# Patient Record
Sex: FEMALE | URBAN - METROPOLITAN AREA
[De-identification: names, ages, dates, MRNs, and addresses within clinical notes are randomized per-mention and may not be internally consistent; named-entity substitution may affect disease eponyms.]

---
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
## 2021-06-23 ENCOUNTER — OFFICE VISIT (OUTPATIENT)
Dept: NEUROLOGY | Facility: MEDICAL CENTER | Age: 21
End: 2021-06-23
Attending: PSYCHIATRY & NEUROLOGY
Payer: COMMERCIAL

## 2021-06-23 VITALS
WEIGHT: 277.78 LBS | DIASTOLIC BLOOD PRESSURE: 68 MMHG | BODY MASS INDEX: 41.14 KG/M2 | OXYGEN SATURATION: 95 % | TEMPERATURE: 97.8 F | HEART RATE: 77 BPM | SYSTOLIC BLOOD PRESSURE: 116 MMHG | HEIGHT: 69 IN

## 2021-06-23 DIAGNOSIS — G43.109 MIGRAINE WITH AURA AND WITHOUT STATUS MIGRAINOSUS, NOT INTRACTABLE: Primary | ICD-10-CM

## 2021-06-23 PROCEDURE — 99203 OFFICE O/P NEW LOW 30 MIN: CPT | Performed by: PSYCHIATRY & NEUROLOGY

## 2021-06-23 PROCEDURE — 99211 OFF/OP EST MAY X REQ PHY/QHP: CPT | Performed by: PSYCHIATRY & NEUROLOGY

## 2021-06-23 RX ORDER — SUMATRIPTAN SUCCINATE 4 MG/.5ML
1 INJECTION, SOLUTION SUBCUTANEOUS PRN
COMMUNITY
Start: 2021-06-21

## 2021-06-23 RX ORDER — SUMATRIPTAN SUCCINATE 4 MG/.5ML
INJECTION, SOLUTION SUBCUTANEOUS PRN
COMMUNITY
End: 2021-06-23

## 2021-06-23 RX ORDER — RIBOFLAVIN (VITAMIN B2) 400 MG
400 TABLET ORAL DAILY
Qty: 90 TABLET | Refills: 2 | Status: SHIPPED | OUTPATIENT
Start: 2021-06-23

## 2021-06-23 ASSESSMENT — PATIENT HEALTH QUESTIONNAIRE - PHQ9: CLINICAL INTERPRETATION OF PHQ2 SCORE: 0

## 2021-06-23 NOTE — PATIENT INSTRUCTIONS
Take the following vitamins for migraine prevention:   Riboflavin 400mg daily   Magnesium citrate 400mg daily   Coenzyme Q10 300mg daily

## 2021-06-23 NOTE — PROGRESS NOTES
Chief Complaint   Patient presents with   • New Patient     Migraine       History of present illness:  Karen Quintero 20 y.o. female with episodic migraine, using sumatriptan.     On waking up, she first noticed loss of sensation on one side of her body associated with incoherent speech. Following the numbness, she experiences nausea and a stabbing posterior headache, usually the right side. Sumatriptan has been effective but she did not have it.   She has had migraines 3 times in the past month but she did not have migraines for the past year  She has noticed that tomatoes trigger her migraines.    Her headache is similar to her previous headaches but she has never had incoherent speech and numbness before.   Last November she started an oral estrogen containing birth control.   She has been having migraines since age 12.     Past medical history:   Past Medical History:   Diagnosis Date   • Allergy, unspecified not elsewhere classified    • Asthma    • Headache(784.0)    • Headache, classical migraine        Past surgical history:   History reviewed. No pertinent surgical history.    Family history:   History reviewed. No pertinent family history.    Social history:   Social History     Socioeconomic History   • Marital status: Single     Spouse name: Not on file   • Number of children: Not on file   • Years of education: Not on file   • Highest education level: Not on file   Occupational History   • Not on file   Tobacco Use   • Smoking status: Never Smoker   • Smokeless tobacco: Never Used   Substance and Sexual Activity   • Alcohol use: No   • Drug use: No   • Sexual activity: Not on file   Other Topics Concern   • Not on file   Social History Narrative   • Not on file     Social Determinants of Health     Financial Resource Strain:    • Difficulty of Paying Living Expenses:    Food Insecurity:    • Worried About Running Out of Food in the Last Year:    • Ran Out of Food in the Last Year:    Transportation  "Needs:    • Lack of Transportation (Medical):    • Lack of Transportation (Non-Medical):    Physical Activity:    • Days of Exercise per Week:    • Minutes of Exercise per Session:    Stress:    • Feeling of Stress :    Social Connections:    • Frequency of Communication with Friends and Family:    • Frequency of Social Gatherings with Friends and Family:    • Attends [employer] Services:    • Active Member of Clubs or Organizations:    • Attends Club or Organization Meetings:    • Marital Status:    Intimate Partner Violence:    • Fear of Current or Ex-Partner:    • Emotionally Abused:    • Physically Abused:    • Sexually Abused:        Current medications:   Current Outpatient Medications   Medication   • Riboflavin 400 MG Tab   • Magnesium Citrate 200 MG Tab   • Coenzyme Q10 300 MG Cap   • NS SOLN 60 mL with albuterol 2.5 mg/0.5 mL NEBU 5 mL   • promethazine (PHENERGAN) 25 MG Tab   • SUMAtriptan Succinate 4 MG/0.5ML Solution Auto-injector   • hydrocodone-acetaminophen (NORCO) 5-325 MG Tab per tablet     No current facility-administered medications for this visit.       Medication Allergy:  Allergies   Allergen Reactions   • Cephalosporins Anaphylaxis   • Chicken-Derived Products Anaphylaxis   • Peanut Oil Anaphylaxis   • Penicillins Anaphylaxis   • Shellfish Allergy Anaphylaxis   • Tree Nuts Food Allergy Anaphylaxis     Physical examination:   Vitals:    06/23/21 0725   BP: 116/68   BP Location: Left arm   Patient Position: Sitting   BP Cuff Size: Adult   Pulse: 77   Temp: 36.6 °C (97.8 °F)   TempSrc: Temporal   SpO2: 95%   Weight: (!) 126 kg (277 lb 12.5 oz)   Height: 1.753 m (5' 9\")     Neurological Exam  Mental Status  Awake and alert. Speech is normal. Language is fluent with no aphasia.    Cranial Nerves  CN II: Right normal visual field. Left normal visual field. Right funduscopic exam: disc intact. Left funduscopic exam: disc intact.  CN III, IV, VI: Extraocular movements intact bilaterally. No nystagmus. " Normal smooth pursuit.  CN V:  Right: Facial sensation is normal.  Left: Facial sensation is normal on the left.    Motor   No abnormal involuntary movements. Strength is 5/5 throughout all four extremities.    Sensory  Light touch is normal in upper and lower extremities.     Reflexes                                           Right                      Left  Brachioradialis                    0                         0  Biceps                                 0                         0  Patellar                                2+                         2+    Gait  Casual gait is normal including stance, stride, and arm swing. Normal tandem gait.      Labs:  None    Imagin/10/21 MRI BRAIN W/ W/O:   FINDINGS:     Metal artifact arises from the oral cavity causing some limited due to   visualization of the status of the lower anterior frontal poles, right greater   than left on the DWI and GRE T2 pulse sequences.  This area is sufficiently   evaluated on the standard MRI pulse sequence images and appears normal.     No restriction in water diffusion is demonstrated elsewhere within the brain to   suggest acute ischemia.  No acute intracranial hemorrhage, mass, mass effect or   abnormal contrast enhancement identified.  The cortical sulci and ventricles are   normal in size and shape.  Brain signal intensity appears normal on the standard   pulse sequences.     The intracranial arterial tree major dural sinuses are patent.  No significant   mucosal disease demonstrated within the paranasal sinuses.  The temporal bones   are clear.  No orbital abnormality is demonstrated.  The calvarium appears   unremarkable.  The foramen is widely patent.  The cerebellar tonsils are   normally positioned.  The pituitary appears normal in size.     IMPRESSION:   Unremarkable contrast enhanced brain MR exam.  Agree with the   preliminary report.       ASSESSMENT AND PLAN:  Problem List Items Addressed This Visit     Migraine with  aura and without status migrainosus, not intractable - Primary    Relevant Medications    Riboflavin 400 MG Tab    Magnesium Citrate 200 MG Tab    Coenzyme Q10 300 MG Cap    SUMAtriptan Succinate 4 MG/0.5ML Solution Auto-injector          1. Migraine with aura and without status migrainosus, not intractable  - Riboflavin 400 MG Tab; Take 400 mg by mouth every day.  Dispense: 90 tablet; Refill: 2  - Magnesium Citrate 200 MG Tab; Take 2 Tablets by mouth every day.  Dispense: 180 tablet; Refill: 2  - Coenzyme Q10 300 MG Cap; Take 1 capsule by mouth every day.  Dispense: 90 capsule; Refill: 2    Other orders  - SUMAtriptan Succinate 4 MG/0.5ML Solution Auto-injector; Inject 1 Syringe under the skin as needed.    21 y/o female with worsening migraine headaches, associated with sensory aura and incoherent speech. Sumatriptan 4mg is effective for termination. Due to recent increase in frequency of migraine, I have advised her to start vitamins for prevention as listed above. No need currently for prescription preventatives.     FOLLOW-UP:   Return in about 3 months (around 9/23/2021) for follow-up with Regi Orr .    Total time spent for the day for this patient unrelated to procedure time is: 30 minutes. I spent 24 minutes in face to face time and I spent 3 minutes pre-charting and 3 minutes in post-visit documentation.      Dr. Jose Aparicio D.O.  [facility]1983 Neurology   Movement Disorders Specialist

## 2021-07-20 ENCOUNTER — OFFICE VISIT (OUTPATIENT)
Dept: NEUROLOGY | Facility: MEDICAL CENTER | Age: 21
End: 2021-07-20
Attending: PSYCHIATRY & NEUROLOGY
Payer: COMMERCIAL

## 2021-07-20 VITALS
HEIGHT: 69 IN | HEART RATE: 70 BPM | OXYGEN SATURATION: 97 % | WEIGHT: 272.71 LBS | SYSTOLIC BLOOD PRESSURE: 118 MMHG | RESPIRATION RATE: 14 BRPM | BODY MASS INDEX: 40.39 KG/M2 | TEMPERATURE: 97.4 F | DIASTOLIC BLOOD PRESSURE: 64 MMHG

## 2021-07-20 DIAGNOSIS — G43.119 MIGRAINE WITH AURA, INTRACTABLE, WITHOUT STATUS MIGRAINOSUS: ICD-10-CM

## 2021-07-20 PROCEDURE — 99214 OFFICE O/P EST MOD 30 MIN: CPT | Performed by: PSYCHIATRY & NEUROLOGY

## 2021-07-20 PROCEDURE — 99212 OFFICE O/P EST SF 10 MIN: CPT | Performed by: PSYCHIATRY & NEUROLOGY

## 2021-07-20 RX ORDER — TOPIRAMATE 25 MG/1
TABLET ORAL
Qty: 162 TABLET | Refills: 0 | Status: SHIPPED | OUTPATIENT
Start: 2021-07-20 | End: 2021-09-09

## 2021-07-20 NOTE — PROGRESS NOTES
Chief Complaint   Patient presents with   • Follow-Up     Migraine/Other neuro issues not related to migraine       History of present illness:  Karen Quintero 20 y.o. female with episodic migraine with sensory aura.      6/23/21:   19 y/o female with worsening migraine headaches, associated with sensory aura and incoherent speech. Sumatriptan 4mg is effective for termination. Due to recent increase in frequency of migraine, I have advised her to start vitamins for prevention as listed above. No need currently for prescription preventatives.     7/20/21:  She is continuing to have daily headaches. She wakes up with a headache.    She is wondering if there is a connection between her headache and spina bifida. She notices a connection between popping her neck and a migraine.     Past medical history:   Past Medical History:   Diagnosis Date   • Allergy, unspecified not elsewhere classified    • Asthma    • Headache(784.0)    • Headache, classical migraine        Past surgical history:   No past surgical history on file.    Family history:   No family history on file.    Social history:   Social History     Socioeconomic History   • Marital status: Single     Spouse name: Not on file   • Number of children: Not on file   • Years of education: Not on file   • Highest education level: Not on file   Occupational History   • Not on file   Tobacco Use   • Smoking status: Never Smoker   • Smokeless tobacco: Never Used   Vaping Use   • Vaping Use: Never used   Substance and Sexual Activity   • Alcohol use: No   • Drug use: No   • Sexual activity: Not on file   Other Topics Concern   • Not on file   Social History Narrative   • Not on file     Social Determinants of Health     Financial Resource Strain:    • Difficulty of Paying Living Expenses:    Food Insecurity:    • Worried About Running Out of Food in the Last Year:    • Ran Out of Food in the Last Year:    Transportation Needs:    • Lack of Transportation (Medical):   "  • Lack of Transportation (Non-Medical):    Physical Activity:    • Days of Exercise per Week:    • Minutes of Exercise per Session:    Stress:    • Feeling of Stress :    Social Connections:    • Frequency of Communication with Friends and Family:    • Frequency of Social Gatherings with Friends and Family:    • Attends [employer] Services:    • Active Member of Clubs or Organizations:    • Attends Club or Organization Meetings:    • Marital Status:    Intimate Partner Violence:    • Fear of Current or Ex-Partner:    • Emotionally Abused:    • Physically Abused:    • Sexually Abused:        Current medications:   Current Outpatient Medications   Medication   • topiramate (TOPAMAX) 25 MG Tab   • Riboflavin 400 MG Tab   • Magnesium Citrate 200 MG Tab   • Coenzyme Q10 300 MG Cap   • SUMAtriptan Succinate 4 MG/0.5ML Solution Auto-injector   • promethazine (PHENERGAN) 25 MG Tab   • NS SOLN 60 mL with albuterol 2.5 mg/0.5 mL NEBU 5 mL   • hydrocodone-acetaminophen (NORCO) 5-325 MG Tab per tablet     No current facility-administered medications for this visit.       Medication Allergy:  Allergies   Allergen Reactions   • Cephalosporins Anaphylaxis   • Chicken-Derived Products Anaphylaxis   • Peanut Oil Anaphylaxis   • Penicillins Anaphylaxis   • Shellfish Allergy Anaphylaxis   • Tree Nuts Food Allergy Anaphylaxis       Physical examination:   Vitals:    21 1530   BP: 118/64   BP Location: Left arm   Patient Position: Sitting   BP Cuff Size: Adult long   Pulse: 70   Resp: 14   Temp: 36.3 °C (97.4 °F)   TempSrc: Temporal   SpO2: 97%   Weight: 124 kg (272 lb 11.3 oz)   Height: 1.753 m (5' 9\")       Labs:  I reviewed the following labs personally:  None    Imagin/10/21 MRI BRAIN W/ AND W/O:   FINDINGS:     Metal artifact arises from the oral cavity causing some limited due to   visualization of the status of the lower anterior frontal poles, right greater   than left on the DWI and GRE T2 pulse sequences.  This " "area is sufficiently   evaluated on the standard MRI pulse sequence images and appears normal.     No restriction in water diffusion is demonstrated elsewhere within the brain to   suggest acute ischemia.  No acute intracranial hemorrhage, mass, mass effect or   abnormal contrast enhancement identified.  The cortical sulci and ventricles are   normal in size and shape.  Brain signal intensity appears normal on the standard   pulse sequences.     The intracranial arterial tree major dural sinuses are patent.  No significant   mucosal disease demonstrated within the paranasal sinuses.  The temporal bones   are clear.  No orbital abnormality is demonstrated.  The calvarium appears   unremarkable.  The foramen is widely patent.  The cerebellar tonsils are   normally positioned.  The pituitary appears normal in size.     IMPRESSION:   Unremarkable contrast enhanced brain MR exam.  Agree with the   preliminary report.       ASSESSMENT AND PLAN:  Problem List Items Addressed This Visit     Migraine with aura and without status migrainosus, not intractable    Relevant Medications    topiramate (TOPAMAX) 25 MG Tab          1. Migraine with aura and without status migrainosus, not intractable  - topiramate (TOPAMAX) 25 MG Tab; Take 1 tablet by mouth every day for 7 days, THEN 1 tablet 2 times a day for 7 days, THEN 1.5 Tablets 2 times a day for 7 days, THEN 2 Tablets 2 times a day for 30 days.  Dispense: 162 tablet; Refill: 0    The patient is concerned that her headache is related to her neck or low back given that \"popping\" her neck or low back may trigger a migraine. I counseled her that the description of her headache and sensory aura is consistent with migraine and that this is not cervicogenic.   I have started topiramate as a preventative and will increase gradually to 50mg BID. She will follow-up in 1 month with Regi Orr.     FOLLOW-UP:   Return in about 4 weeks (around 8/17/2021) for Reschedule appt with Regi " Dominga to 1 month.    Total time spent for the day for this patient unrelated to procedure time is: 21 minutes. I spent 16 minutes in face to face time and I spent 1 minutes pre-charting and 4 minutes in post-visit documentation.      Dr. Jose Aparicio D.O.  [facility]   Movement Disorders Specialist

## 2021-08-19 ENCOUNTER — OFFICE VISIT (OUTPATIENT)
Dept: NEUROLOGY | Facility: MEDICAL CENTER | Age: 21
End: 2021-08-19
Attending: NURSE PRACTITIONER
Payer: COMMERCIAL

## 2021-08-19 VITALS
BODY MASS INDEX: 39.37 KG/M2 | DIASTOLIC BLOOD PRESSURE: 66 MMHG | WEIGHT: 275 LBS | HEART RATE: 75 BPM | SYSTOLIC BLOOD PRESSURE: 120 MMHG | OXYGEN SATURATION: 99 % | HEIGHT: 70 IN

## 2021-08-19 DIAGNOSIS — E66.01 CLASS 3 SEVERE OBESITY WITH BODY MASS INDEX (BMI) OF 40.0 TO 44.9 IN ADULT, UNSPECIFIED OBESITY TYPE, UNSPECIFIED WHETHER SERIOUS COMORBIDITY PRESENT (HCC): ICD-10-CM

## 2021-08-19 DIAGNOSIS — G43.711 INTRACTABLE CHRONIC MIGRAINE WITHOUT AURA AND WITH STATUS MIGRAINOSUS: ICD-10-CM

## 2021-08-19 PROCEDURE — 99212 OFFICE O/P EST SF 10 MIN: CPT | Performed by: NURSE PRACTITIONER

## 2021-08-19 PROCEDURE — 99213 OFFICE O/P EST LOW 20 MIN: CPT | Performed by: NURSE PRACTITIONER

## 2021-08-19 RX ORDER — LEVONORGESTREL/ETHINYL ESTRADIOL AND ETHINYL ESTRADIOL 100-20(84)
1 KIT ORAL
COMMUNITY
Start: 2021-07-24

## 2021-08-19 RX ORDER — CYCLOBENZAPRINE HCL 10 MG
10 TABLET ORAL 2 TIMES DAILY PRN
Qty: 20 TABLET | Refills: 3 | Status: SHIPPED | OUTPATIENT
Start: 2021-08-19 | End: 2022-11-01 | Stop reason: SDUPTHER

## 2021-08-19 RX ORDER — NORTRIPTYLINE HYDROCHLORIDE 10 MG/1
10 CAPSULE ORAL
Qty: 90 CAPSULE | Refills: 3 | Status: SHIPPED | OUTPATIENT
Start: 2021-08-19 | End: 2022-08-19

## 2021-08-19 ASSESSMENT — ENCOUNTER SYMPTOMS
BLURRED VISION: 1
WHEEZING: 0
NECK PAIN: 1
TINGLING: 1
NAUSEA: 0
DEPRESSION: 0
SHORTNESS OF BREATH: 0
COUGH: 0
SENSORY CHANGE: 1
HEARTBURN: 0
BACK PAIN: 1
DIARRHEA: 0
NERVOUS/ANXIOUS: 0
HEADACHES: 1
FEVER: 0
VOMITING: 0
CONSTIPATION: 0

## 2021-08-19 NOTE — PROGRESS NOTES
Subjective   HPI  Karen Quintero is a 21 y.o. female who presents for chronic migraine.    She previously saw Dr Aparicio, he started her on Topamax, she is currently on 50mg BID and has developed tingling of numbness.    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          Age at Onset: 12  Triggers:  Chocolate, dehydration, hormones, allergies.   Alleviating factors:   Laying down, sleeping, dark room, ice on head and neck   Meds tried and result:                    A) Preventative:  Topiramate has not helped with the frequent headaches but has helped with the migraines. (has been on it for 1 month)                    B) Abortive:  Sumatriptan injections works sometimes, sometimes takes it with benadryl,  Occasional tylenol for mild headaches.   Hormones:  Birth control.   Flexeril helps with the neck pain.  Caffeine use:  100mg of caffeine 1-2 times per week  OTC medications--frequency:  Rare tylenol  How many days per month:  12/30  Missed days of school/work in past 6 months 10 days of work  Characteristics:                   A) Location: neck and shoulders.              B)Duration:  2 days is the longest, typically 24 hrs             C)Intensity:   10               D) Quality of pain: pounding in head with knife cutting feeling in neck, throbbing,               E) Associated Symptoms :  Slurred speech, numbness of both sides of body, tingling,    N&V:  Both   Photo/phonophobia: both   Aura:  none  ER/Urgent care visits in past 6 months 4        Review of Systems   Constitutional: Negative for fever.   HENT: Negative for hearing loss.    Eyes: Positive for blurred vision.   Respiratory: Negative for cough, shortness of breath and wheezing.    Cardiovascular: Negative for chest pain.   Gastrointestinal: Negative for constipation, diarrhea, heartburn, nausea and vomiting.   Musculoskeletal: Positive for back  "pain and neck pain.   Skin: Negative for rash.   Neurological: Positive for tingling, sensory change and headaches.   Endo/Heme/Allergies: Positive for environmental allergies.   Psychiatric/Behavioral: Negative for depression. The patient is not nervous/anxious.            Objective     /66 (BP Location: Right arm, Patient Position: Sitting)   Pulse 75   Ht 1.765 m (5' 9.5\")   Wt 125 kg (275 lb)   SpO2 99%   BMI 40.03 kg/m²      PHYSICAL ASSESSMENT  Constitutional:  Alert, no apparent distress,  Psych:   mood and affect WNL  Muskuloskeletal:  Moves all extremities equally, strength 5/5 bilaterally, no drift  NEUROLOGICAL ASSESSMENT  Oriented X 4, speech fluent, naming and memory intact  CN II: Visual fields are full to confrontation. Fundoscopic exam is normal with sharp discs and no vascular changes. Pupils are 3 mm and briskly reactive to light.   CN III, IV, VI  EOMs intact, no ptosis  CN V: Facial sensation is intact to pinprick in all 3 divisions bilaterally. Corneal responses are intact.  CN VII: Face is symmetric with normal eye closure and smile.  CN VII: Hearing is normal to rubbing fingers  CN IX, X: Palate elevates symmetrically. Phonation is normal.  CN XI: Head turning and shoulder shrug are intact  CN XII: Tongue is midline with normal movements and no atrophy.                           Sensation to PP equal bilaterally                 No limb ataxia with finger to nose and heel to shin                 Ambulates with steady gait.                 Rhomberg negative                Biceps,brachioradialis, tricep, patella 2+ reflexes  Cardiovascular:    S1S2, no abnormal rhythm auscultated, no peripheral edema  Neck:                     No carotid bruits noted   Pulmonary:            Respirations easy, lungs clear to auscultation all fields.     Skin:                     No obvious rashes.          Assessment & Plan       1. Intractable chronic migraine without aura and with status " migrainosus      May take flexeril 10mg up to twice daily for headache, no more than 20 tabs per month    Wean the topiramate to 25mg in the AM and 50mg at night x 1 week, then 50mg at night x 1 week, then 25mg at night until we see you in the office again     When topiramate is down to 25mg at night, add nortriptyline 10mg every night, this is a preventative.     Continue supplements:    I recommend the following over the counter supplements every night at bedtime:   magnesium oxide 400mg by mouth every night, may take extra dose if needed for headache (over the counter), hold for diarrhea   Riboflavin (Vitamin B2) 400mg by mouth every night (over the counter),may turn urine bright yellow   COQ 10, take 300mg every night. (over the counter)          Attempt to go to bed and get up at the same time every night           Eat meals on regular basis            Stay hydrated.             Aerobic exercise 30 minutes daily             Avoid aged or smoked foods, avoid processed foods, red wine, aged cheese              Keep headache diary, include foods that you may have eaten.             Avoid overusing over the counter medications:  Do not take more than 500mg acetaminophen (tylenol), more than 4 times weekly, more frequent or larger doses are associated with medication overuse headache.      2. Class 3 severe obesity with body mass index (BMI) of 40.0 to 44.9 in adult, unspecified obesity type, unspecified whether serious comorbidity present (HCC)    Referral to nutrition services per request.       I spent 27 minutes caring for patient, my time includes reviewing the chart, obtaining current HPI, exam, discussion of disease process, ordering testing and medications and counseling.      I counseled patient on migraine triggers, lifestyle changes, medication overuse, supplements and medication side effects.      Follow up in 3 months

## 2021-08-19 NOTE — PATIENT INSTRUCTIONS
May take flexeril 10mg up to twice daily for headache, no more than 20 tabs per month    Wean the topiramate to 25mg in the AM and 50mg at night x 1 week, then 50mg at night x 1 week, then 25mg at night until we see you in the office again       When topiramate is down to 25mg at night, add nortriptyline 10mg every night, this is a preventative.       Migraine Headache  A migraine headache is a very strong throbbing pain on one side or both sides of your head. This type of headache can also cause other symptoms. It can last from 4 hours to 3 days. Talk with your doctor about what things may bring on (trigger) this condition.  What are the causes?  The exact cause of this condition is not known. This condition may be triggered or caused by:  · Drinking alcohol.  · Smoking.  · Taking medicines, such as:  ? Medicine used to treat chest pain (nitroglycerin).  ? Birth control pills.  ? Estrogen.  ? Some blood pressure medicines.  · Eating or drinking certain products.  · Doing physical activity.  Other things that may trigger a migraine headache include:  · Having a menstrual period.  · Pregnancy.  · Hunger.  · Stress.  · Not getting enough sleep or getting too much sleep.  · Weather changes.  · Tiredness (fatigue).  What increases the risk?  · Being 25-55 years old.  · Being female.  · Having a family history of migraine headaches.  · Being .  · Having depression or anxiety.  · Being very overweight.  What are the signs or symptoms?  · A throbbing pain. This pain may:  ? Happen in any area of the head, such as on one side or both sides.  ? Make it hard to do daily activities.  ? Get worse with physical activity.  ? Get worse around bright lights or loud noises.  · Other symptoms may include:  ? Feeling sick to your stomach (nauseous).  ? Vomiting.  ? Dizziness.  ? Being sensitive to bright lights, loud noises, or smells.  · Before you get a migraine headache, you may get warning signs (an aura). An aura may  include:  ? Seeing flashing lights or having blind spots.  ? Seeing bright spots, halos, or zigzag lines.  ? Having tunnel vision or blurred vision.  ? Having numbness or a tingling feeling.  ? Having trouble talking.  ? Having weak muscles.  · Some people have symptoms after a migraine headache (postdromal phase), such as:  ? Tiredness.  ? Trouble thinking (concentrating).  How is this treated?  · Taking medicines that:  ? Relieve pain.  ? Relieve the feeling of being sick to your stomach.  ? Prevent migraine headaches.  · Treatment may also include:  ? Having acupuncture.  ? Avoiding foods that bring on migraine headaches.  ? Learning ways to control your body functions (biofeedback).  ? Therapy to help you know and deal with negative thoughts (cognitive behavioral therapy).  Follow these instructions at home:  Medicines  · Take over-the-counter and prescription medicines only as told by your doctor.  · Ask your doctor if the medicine prescribed to you:  ? Requires you to avoid driving or using heavy machinery.  ? Can cause trouble pooping (constipation). You may need to take these steps to prevent or treat trouble pooping:  § Drink enough fluid to keep your pee (urine) pale yellow.  § Take over-the-counter or prescription medicines.  § Eat foods that are high in fiber. These include beans, whole grains, and fresh fruits and vegetables.  § Limit foods that are high in fat and sugar. These include fried or sweet foods.  Lifestyle  · Do not drink alcohol.  · Do not use any products that contain nicotine or tobacco, such as cigarettes, e-cigarettes, and chewing tobacco. If you need help quitting, ask your doctor.  · Get at least 8 hours of sleep every night.  · Limit and deal with stress.  General instructions         · Keep a journal to find out what may bring on your migraine headaches. For example, write down:  ? What you eat and drink.  ? How much sleep you get.  ? Any change in what you eat or drink.  ? Any  change in your medicines.  · If you have a migraine headache:  ? Avoid things that make your symptoms worse, such as bright lights.  ? It may help to lie down in a dark, quiet room.  ? Do not drive or use heavy machinery.  ? Ask your doctor what activities are safe for you.  · Keep all follow-up visits as told by your doctor. This is important.  Contact a doctor if:  · You get a migraine headache that is different or worse than others you have had.  · You have more than 15 headache days in one month.  Get help right away if:  · Your migraine headache gets very bad.  · Your migraine headache lasts longer than 72 hours.  · You have a fever.  · You have a stiff neck.  · You have trouble seeing.  · Your muscles feel weak or like you cannot control them.  · You start to lose your balance a lot.  · You start to have trouble walking.  · You pass out (faint).  · You have a seizure.  Summary  · A migraine headache is a very strong throbbing pain on one side or both sides of your head. These headaches can also cause other symptoms.  · This condition may be treated with medicines and changes to your lifestyle.  · Keep a journal to find out what may bring on your migraine headaches.  · Contact a doctor if you get a migraine headache that is different or worse than others you have had.  · Contact your doctor if you have more than 15 headache days in a month.  This information is not intended to replace advice given to you by your health care provider. Make sure you discuss any questions you have with your health care provider.  Document Released: 09/26/2009 Document Revised: 04/10/2020 Document Reviewed: 01/30/2020  Elsevier Patient Education © 2020 [employer]

## 2021-12-09 ENCOUNTER — OFFICE VISIT (OUTPATIENT)
Dept: NEUROLOGY | Facility: MEDICAL CENTER | Age: 21
End: 2021-12-09
Attending: NURSE PRACTITIONER
Payer: COMMERCIAL

## 2021-12-09 VITALS
HEART RATE: 93 BPM | BODY MASS INDEX: 41.95 KG/M2 | TEMPERATURE: 97.5 F | SYSTOLIC BLOOD PRESSURE: 118 MMHG | DIASTOLIC BLOOD PRESSURE: 64 MMHG | RESPIRATION RATE: 16 BRPM | OXYGEN SATURATION: 97 % | HEIGHT: 70 IN | WEIGHT: 293 LBS

## 2021-12-09 DIAGNOSIS — G43.709 CHRONIC MIGRAINE W/O AURA W/O STATUS MIGRAINOSUS, NOT INTRACTABLE: ICD-10-CM

## 2021-12-09 PROCEDURE — 99212 OFFICE O/P EST SF 10 MIN: CPT | Performed by: NURSE PRACTITIONER

## 2021-12-09 PROCEDURE — 99213 OFFICE O/P EST LOW 20 MIN: CPT | Performed by: NURSE PRACTITIONER

## 2021-12-09 ASSESSMENT — ENCOUNTER SYMPTOMS
HEADACHES: 1
DEPRESSION: 0
BACK PAIN: 1
SHORTNESS OF BREATH: 0
NAUSEA: 0
SINUS PAIN: 0
NERVOUS/ANXIOUS: 0
NECK PAIN: 1
FOCAL WEAKNESS: 0
FEVER: 0
WEAKNESS: 0
SENSORY CHANGE: 0
TINGLING: 0
BLURRED VISION: 0
VOMITING: 0

## 2021-12-09 NOTE — PROGRESS NOTES
Subjective       HPI  Karen Quintero is a 21 y.o. female who presents for chronic migraine.    I last saw her in August 2021.        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         She is here alone today, she continues to take Flexeril PRN for neck pain, 1-2 nights per week.  She only takes Nortriptyline PRN.  Her headaches have greatly improved.  She feels that the headaches were probably related to allergies.        Age at Onset: 12  Triggers:  Chocolate, dehydration, hormones, allergies.   Alleviating factors:   Laying down, sleeping, dark room, ice on head and neck   Meds tried and result:                    A) Preventative:  Topiramate has not helped with the frequent headaches but has helped with the migraines. (has been on it for 1 month)  Nortriptyline has helped.                      B) Abortive:  Sumatriptan injections works sometimes, sometimes takes it with benadryl,  Occasional tylenol for mild headaches. Flexeril helps with the neck pain.    Hormones:  Birth control.   Caffeine use:  100mg of caffeine 1-2 times per week  OTC medications--frequency:  Rare tylenol  How many days per month:  1-2/30  Missed days of school/work in past 6 months 10 days of work  Characteristics:                   A) Location: neck and shoulders.              B)Duration:  2 days is the longest, typically 24 hrs             C)Intensity:   10               D) Quality of pain: pounding in head with knife cutting feeling in neck, throbbing,               E) Associated Symptoms :  Slurred speech, numbness of both sides of body, tingling,    N&V:  Both   Photo/phonophobia: both   Aura:  none  ER/Urgent care visits in past 6 months 4            Review of Systems   Constitutional: Negative for fever.   HENT: Negative for congestion and sinus pain.    Eyes: Negative for blurred vision.   Respiratory: Negative for shortness  "of breath.    Cardiovascular: Negative for chest pain.   Gastrointestinal: Negative for nausea and vomiting.   Musculoskeletal: Positive for back pain and neck pain.   Skin: Negative for rash.   Neurological: Positive for headaches. Negative for tingling, sensory change, focal weakness and weakness.   Psychiatric/Behavioral: Negative for depression. The patient is not nervous/anxious.               Objective     /64 (BP Location: Right arm, Patient Position: Sitting, BP Cuff Size: Large adult)   Pulse 93   Temp 36.4 °C (97.5 °F) (Temporal)   Resp 16   Ht 1.765 m (5' 9.5\")   Wt (!) 134 kg (295 lb 3.1 oz)   SpO2 97%   BMI 42.97 kg/m²      PHYSICAL ASSESSMENT  Constitutional:  Alert, no apparent distress,  Psych:   mood and affect WNL  Muskuloskeletal:  Moves all extremities equally, strength 5/5 bilaterally, no drift  NEUROLOGICAL ASSESSMENT  Oriented X 4, speech fluent, naming and memory intact  CN II: Visual fields are full to confrontation. Fundoscopic exam is normal with sharp discs and no vascular changes. Pupils are 4 mm and briskly reactive to light.   CN III: IV, VI  EOMs intact, no ptosis  CN V: Facial sensation is intact to pinprick in all 3 divisions bilaterally. Corneal responses are intact.  CN VII: Face is symmetric with normal eye closure and smile.  CN VIII Hearing is normal to rubbing fingers  CN IX, X: Palate elevates symmetrically. Phonation is normal.  CN XI: Head turning and shoulder shrug are intact  CN XII: Tongue is midline with normal movements and no atrophy.                           Sensation to PP equal bilaterally                 No limb ataxia with finger to nose and heel to shin                 Ambulates with steady gait.                 Rhomberg negative                Biceps,brachioradialis, tricep, and patellar reflexex all 2+     Cardiovascular:    S1S2, no abnormal rhythm auscultated, no peripheral edema  Neck:                     Supple  Pulmonary:            " Respirations easy, lungs clear to auscultation all fields.     Skin:                     No obvious rashes.          Assessment & Plan   1. Chronic migraine w/o aura w/o status migrainosus, not intractable    May continue flexeril 10mg up to twice daily for headache, no more than 20 tabs per month     Uses  nortriptyline 10mg at night, PRN    Continue supplements:    I recommend the following over the counter supplements every night at bedtime:   magnesium oxide 400mg by mouth every night, may take extra dose if needed for headache (over the counter), hold for diarrhea   Riboflavin (Vitamin B2) 400mg by mouth every night (over the counter),may turn urine bright yellow   COQ 10, take 300mg every night. (over the counter)          Attempt to go to bed and get up at the same time every night           Eat meals on regular basis            Stay hydrated.             Aerobic exercise 30 minutes daily             Avoid aged or smoked foods, avoid processed foods, red wine, aged cheese              Keep headache diary, include foods that you may have eaten.             Avoid overusing over the counter medications:  Do not take more than 500mg acetaminophen (tylenol), more than 4 times weekly, more frequent or larger doses are associated with medication overuse headache.             I counseled patient on migraine triggers, lifestyle changes, medication overuse, supplements and medication side effects.        Follow up in 1 year

## 2022-11-01 ENCOUNTER — OFFICE VISIT (OUTPATIENT)
Dept: NEUROLOGY | Facility: MEDICAL CENTER | Age: 22
End: 2022-11-01
Attending: NURSE PRACTITIONER
Payer: COMMERCIAL

## 2022-11-01 VITALS
TEMPERATURE: 97.2 F | SYSTOLIC BLOOD PRESSURE: 130 MMHG | HEART RATE: 88 BPM | WEIGHT: 293 LBS | DIASTOLIC BLOOD PRESSURE: 70 MMHG | OXYGEN SATURATION: 96 % | HEIGHT: 69 IN | BODY MASS INDEX: 43.4 KG/M2

## 2022-11-01 DIAGNOSIS — M54.2 NECK PAIN: ICD-10-CM

## 2022-11-01 DIAGNOSIS — G43.709 CHRONIC MIGRAINE W/O AURA W/O STATUS MIGRAINOSUS, NOT INTRACTABLE: ICD-10-CM

## 2022-11-01 DIAGNOSIS — G43.711 INTRACTABLE CHRONIC MIGRAINE WITHOUT AURA AND WITH STATUS MIGRAINOSUS: ICD-10-CM

## 2022-11-01 PROCEDURE — 99214 OFFICE O/P EST MOD 30 MIN: CPT | Performed by: NURSE PRACTITIONER

## 2022-11-01 PROCEDURE — 96372 THER/PROPH/DIAG INJ SC/IM: CPT | Performed by: NURSE PRACTITIONER

## 2022-11-01 PROCEDURE — 99212 OFFICE O/P EST SF 10 MIN: CPT | Performed by: NURSE PRACTITIONER

## 2022-11-01 PROCEDURE — 700111 HCHG RX REV CODE 636 W/ 250 OVERRIDE (IP): Performed by: NURSE PRACTITIONER

## 2022-11-01 RX ORDER — NORTRIPTYLINE HYDROCHLORIDE 10 MG/1
CAPSULE ORAL
COMMUNITY
End: 2022-11-01 | Stop reason: SDUPTHER

## 2022-11-01 RX ORDER — SULFAMETHOXAZOLE AND TRIMETHOPRIM 800; 160 MG/1; MG/1
TABLET ORAL
COMMUNITY

## 2022-11-01 RX ORDER — TOPIRAMATE 25 MG/1
25 TABLET ORAL 2 TIMES DAILY
COMMUNITY

## 2022-11-01 RX ORDER — NORTRIPTYLINE HYDROCHLORIDE 10 MG/1
20 CAPSULE ORAL NIGHTLY
Qty: 180 CAPSULE | Refills: 3 | Status: SHIPPED | OUTPATIENT
Start: 2022-11-01 | End: 2023-11-01

## 2022-11-01 RX ORDER — EPINEPHRINE 0.3 MG/.3ML
INJECTION SUBCUTANEOUS
COMMUNITY

## 2022-11-01 RX ORDER — CYCLOBENZAPRINE HCL 10 MG
10 TABLET ORAL 2 TIMES DAILY PRN
Qty: 15 TABLET | Refills: 3 | Status: SHIPPED | OUTPATIENT
Start: 2022-11-01

## 2022-11-01 RX ORDER — KETOROLAC TROMETHAMINE 30 MG/ML
60 INJECTION, SOLUTION INTRAMUSCULAR; INTRAVENOUS ONCE
Status: COMPLETED | OUTPATIENT
Start: 2022-11-01 | End: 2022-11-01

## 2022-11-01 RX ADMIN — KETOROLAC TROMETHAMINE 60 MG: 60 INJECTION, SOLUTION INTRAMUSCULAR at 16:26

## 2022-11-01 ASSESSMENT — ENCOUNTER SYMPTOMS
NERVOUS/ANXIOUS: 1
NECK PAIN: 1
HEADACHES: 1
DOUBLE VISION: 0
DEPRESSION: 0
BLURRED VISION: 1
SINUS PAIN: 0
NAUSEA: 1
VOMITING: 0
COUGH: 0

## 2022-11-01 ASSESSMENT — PATIENT HEALTH QUESTIONNAIRE - PHQ9: CLINICAL INTERPRETATION OF PHQ2 SCORE: 0

## 2022-11-01 NOTE — PROGRESS NOTES
Subjective       HPI    Karen Quintero is a 22 y.o. female who presents for follow up for chronic migraine.    I last saw on 12/9/2021.     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       She is here alone.  She has been having more headaches since the Spring of 2022, now she is having more neck and shoulder pain. She notices increased pain when she turns her head. She has not done any PT.  She takes flexeril PRN, she tried not to take it too much.    She remains on nortriptyline 10mg PO QHS            Age at Onset: 12  Triggers:  Chocolate, dehydration, hormones, allergies.   Alleviating factors:   Laying down, sleeping, dark room, ice on head and neck   Meds tried and result:                    A) Preventative:  Topiramate has not helped with the frequent headaches but has helped with the migraines. (has been on it for 1 month)  Nortriptyline has helped.                      B) Abortive:  Sumatriptan injections works sometimes, sometimes takes it with benadryl,  Occasional tylenol for mild headaches. Flexeril helps with the neck pain.     Hormones:  Birth control.   Caffeine use:  100mg of caffeine 1-2 times per week  OTC medications--frequency:  Rare tylenol  How many days per month:  1-2/30  Missed days of school/work in past 6 months 10 days of work  Characteristics:                   A) Location: neck and shoulders.              B)Duration:  2 days is the longest, typically 24 hrs             C)Intensity:   10               D) Quality of pain: pounding in head with knife cutting feeling in neck, throbbing,               E) Associated Symptoms :  Slurred speech, numbness of both sides of body, tingling,    N&V:  Both   Photo/phonophobia: both   Aura:  none  ER/Urgent care visits in past 6 months 4          Review of Systems   HENT:  Negative for congestion and sinus pain.     Eyes:  Positive for blurred vision. Negative for double vision.   Respiratory:  Negative for cough.    Cardiovascular:  Negative for chest pain.   Gastrointestinal:  Positive for nausea. Negative for vomiting.   Musculoskeletal:  Positive for joint pain and neck pain.   Neurological:  Positive for headaches.   Endo/Heme/Allergies:  Positive for environmental allergies.   Psychiatric/Behavioral:  Negative for depression. The patient is nervous/anxious.             Current Outpatient Medications on File Prior to Visit   Medication Sig Dispense Refill    EPINEPHrine (EPIPEN) 0.3 MG/0.3ML Solution Auto-injector solution for injection epinephrine 0.3 mg/0.3 mL injection, auto-injector   INJECT CONTENTS OF 1 PEN AS NEEDED FOR ALLERGIC REACTION      nortriptyline (PAMELOR) 10 MG Cap nortriptyline 10 mg capsule   TAKE 1 CAPSULE BY MOUTH AT BEDTIME      sulfamethoxazole-trimethoprim (BACTRIM DS) 800-160 MG tablet sulfamethoxazole 800 mg-trimethoprim 160 mg tablet   TAKE 1 TABLET BY MOUTH TWICE DAILY FOR 5 DAYS      topiramate (TOPAMAX) 25 MG Tab Take 25 mg by mouth 2 times a day.      Fexofenadine HCl (ALLEGRA ALLERGY PO) Take  by mouth.      CAMRESE LO 0.1-0.02 & 0.01 MG Tab Take 1 Tablet by mouth.      cyclobenzaprine (FLEXERIL) 10 mg Tab Take 1 Tablet by mouth 2 times a day as needed. 20 Tablet 3    Riboflavin 400 MG Tab Take 400 mg by mouth every day. 90 tablet 2    Magnesium Citrate 200 MG Tab Take 2 Tablets by mouth every day. 180 tablet 2    Coenzyme Q10 300 MG Cap Take 1 capsule by mouth every day. 90 capsule 2    SUMAtriptan Succinate 4 MG/0.5ML Solution Auto-injector Inject 1 Syringe under the skin as needed.      promethazine (PHENERGAN) 25 MG Tab Take 25 mg by mouth every 6 hours as needed for Nausea/Vomiting.       No current facility-administered medications on file prior to visit.       Past Medical History:   Diagnosis Date    Allergy, unspecified not elsewhere classified     Asthma     Headache(784.0)      Headache, classical migraine           Social History     Tobacco Use    Smoking status: Never    Smokeless tobacco: Never   Vaping Use    Vaping Use: Never used   Substance Use Topics    Alcohol use: No    Drug use: No      Objective     I personally reviewed imaging below and agree with findings:    MRI brain 6/9/2021      Metal artifact arises from the oral cavity causing some limited due to   visualization of the status of the lower anterior frontal poles, right greater   than left on the DWI and GRE T2 pulse sequences.  This area is sufficiently   evaluated on the standard MRI pulse sequence images and appears normal.     No restriction in water diffusion is demonstrated elsewhere within the brain to   suggest acute ischemia.  No acute intracranial hemorrhage, mass, mass effect or   abnormal contrast enhancement identified.  The cortical sulci and ventricles are   normal in size and shape.  Brain signal intensity appears normal on the standard   pulse sequences.     The intracranial arterial tree major dural sinuses are patent.  No significant   mucosal disease demonstrated within the paranasal sinuses.  The temporal bones   are clear.  No orbital abnormality is demonstrated.  The calvarium appears   unremarkable.  The foramen is widely patent.  The cerebellar tonsils are   normally positioned.  The pituitary appears normal in size.      CTA head 6/9/2021    Anterior circulation: The bilateral internal carotid, middle cerebral and   anterior cerebral arteries are patent without significant stenosis or occlusion.   The anterior communicating artery is visualized. There is no aneurysm.     Posterior circulation: The distal vertebral, basilar and bilateral posterior   cerebral arteries are patent without significant stenosis or occlusion. The   posterior communicating arteries are visualized. There is no aneurysm.     Other: The brain parenchyma is unremarkable. There is no intracranial mass,   hemorrhage,  "extra-axial fluid collection, abnormal enhancement, hydrocephalus,   or midline shift.     CTA neck 6/9/2021      Aortic arch: The aortic arch and origins of the great vessels are unremarkable   without significant stenosis or occlusion.     Right carotid system: The common, internal, and external carotid arteries are   unremarkable without significant stenosis or occlusion. No dissection or   pseudoaneurysm noted.     Left carotid system: The common, internal, and external carotid arteries are   unremarkable without significant stenosis or occlusion. No dissection or   pseudoaneurysm noted.     Vertebrobasilar system: The vertebral arteries originate from their respective   subclavian arteries and are co-dominant. No significant stenosis or occlusion   noted. No aneurysm is identified.     Other: No mass, adenopathy, or fluid collection noted in the neck. Visualized   portions of the lung apices and mediastinum are unremarkable. No aggressive   osseous lesion is noted.     /70 (BP Location: Right arm, Patient Position: Sitting, BP Cuff Size: Adult)   Pulse 88   Temp 36.2 °C (97.2 °F) (Temporal)   Ht 1.753 m (5' 9\")   Wt (!) 138 kg (303 lb 12.7 oz)   SpO2 96%   BMI 44.86 kg/m²        PHYSICAL EXAM  Constitutional:  Alert, no apparent distress,  Psych:   mood and affect WNL     Neuro:  Oriented X 4, speech fluent, naming and memory intact          Muskuloskeletal:  Moves all extremities equally, strength 5/5 bilaterally,          CN II: . Fundoscopic exam is normal with sharp discs and no vascular changes. Pupils are 3 mm and briskly reactive to light.          CN III, IV, VI  EOMs intact, no ptosis         CN V: Corneal responses are intact.         CN VII: Face is symmetric with normal eye closure and smile.         CN IX, X: Palate elevates symmetrically. Phonation is normal.         CN XI: Head turning and shoulder shrug are intact         CN XII: Tongue is midline with normal movements and no " atrophy.                                       Ambulates with steady gait.                              Cardiovascular:    S1S2, no abnormal rhythm auscultated, no peripheral edema  Neck:                    Supple  Pulmonary:            Respirations easy, lungs clear to auscultation all fields.     Skin:                     No obvious rashes.           Assessment & Plan        1. Chronic migraine w/o aura w/o status migrainosus, not intractable, she has been having a severe headache for about 2 days    Has had increased migraine days since the Spring of 2022    - ketorolac (TORADOL) injection 60 mg  - Referral to Neurology  ncrease nortriptyline to 20mg every night, (right now she takes as needed).    2. Neck pain     Referral to physical therapy  May continue flexeril 10mg up to twice daily for headache, no more than 15 tabs per month     I   Continue supplements:    I recommend the following over the counter supplements every night at bedtime:   magnesium oxide 400mg by mouth every night, may take extra dose if needed for headache (over the counter), hold for diarrhea   Riboflavin (Vitamin B2) 400mg by mouth every night (over the counter),may turn urine bright yellow   COQ 10, take 300mg every night. (over the counter)          Attempt to go to bed and get up at the same time every night           Eat meals on regular basis            Stay hydrated.             Aerobic exercise 30 minutes daily             Avoid aged or smoked foods, avoid processed foods, red wine, aged cheese              Keep headache diary, include foods that you may have eaten.             Avoid overusing over the counter medications:  Do not take more than 500mg acetaminophen (tylenol), more than 4 times weekly, more frequent or larger doses are associated with medication overuse headache.              I counseled patient on migraine triggers, lifestyle changes, medication overuse, supplements and medication side effects.            Follow  up in 1 year

## 2023-02-21 ENCOUNTER — NON-PROVIDER VISIT (OUTPATIENT)
Dept: URGENT CARE | Facility: CLINIC | Age: 23
End: 2023-02-21

## 2023-02-21 DIAGNOSIS — Z02.1 PRE-EMPLOYMENT DRUG SCREENING: ICD-10-CM

## 2023-02-21 LAB
AMP AMPHETAMINE: NEGATIVE
COC COCAINE: NEGATIVE
INT CON NEG: NORMAL
INT CON POS: NORMAL
MET METHAMPHETAMINES: NEGATIVE
OPI OPIATES: NEGATIVE
PCP PHENCYCLIDINE: NEGATIVE
POC DRUG COMMENT 753798-OCCUPATIONAL HEALTH: NEGATIVE
THC: NEGATIVE

## 2023-02-21 PROCEDURE — 80305 DRUG TEST PRSMV DIR OPT OBS: CPT | Performed by: STUDENT IN AN ORGANIZED HEALTH CARE EDUCATION/TRAINING PROGRAM

## 2023-04-13 ENCOUNTER — NON-PROVIDER VISIT (OUTPATIENT)
Dept: URGENT CARE | Facility: CLINIC | Age: 23
End: 2023-04-13

## 2023-04-13 DIAGNOSIS — Z02.1 PRE-EMPLOYMENT DRUG SCREENING: ICD-10-CM

## 2023-04-13 LAB
AMP AMPHETAMINE: NORMAL
COC COCAINE: NORMAL
INT CON NEG: NEGATIVE
INT CON POS: POSITIVE
MET METHAMPHETAMINES: NORMAL
OPI OPIATES: NORMAL
PCP PHENCYCLIDINE: NORMAL
POC DRUG COMMENT 753798-OCCUPATIONAL HEALTH: NEGATIVE
THC: NORMAL

## 2023-04-13 PROCEDURE — 80305 DRUG TEST PRSMV DIR OPT OBS: CPT | Performed by: NURSE PRACTITIONER

## 2023-11-07 ENCOUNTER — APPOINTMENT (OUTPATIENT)
Dept: NEUROLOGY | Facility: MEDICAL CENTER | Age: 23
End: 2023-11-07
Payer: COMMERCIAL

## 2024-10-31 ENCOUNTER — APPOINTMENT (RX ONLY)
Dept: URBAN - METROPOLITAN AREA CLINIC 15 | Facility: CLINIC | Age: 24
Setting detail: DERMATOLOGY
End: 2024-10-31

## 2024-10-31 DIAGNOSIS — Z71.89 OTHER SPECIFIED COUNSELING: ICD-10-CM

## 2024-10-31 DIAGNOSIS — L81.4 OTHER MELANIN HYPERPIGMENTATION: ICD-10-CM

## 2024-10-31 DIAGNOSIS — L91.0 HYPERTROPHIC SCAR: ICD-10-CM

## 2024-10-31 DIAGNOSIS — D18.0 HEMANGIOMA: ICD-10-CM

## 2024-10-31 DIAGNOSIS — D22 MELANOCYTIC NEVI: ICD-10-CM

## 2024-10-31 DIAGNOSIS — B07.0 PLANTAR WART: ICD-10-CM

## 2024-10-31 PROBLEM — D22.62 MELANOCYTIC NEVI OF LEFT UPPER LIMB, INCLUDING SHOULDER: Status: ACTIVE | Noted: 2024-10-31

## 2024-10-31 PROBLEM — D22.39 MELANOCYTIC NEVI OF OTHER PARTS OF FACE: Status: ACTIVE | Noted: 2024-10-31

## 2024-10-31 PROBLEM — D18.01 HEMANGIOMA OF SKIN AND SUBCUTANEOUS TISSUE: Status: ACTIVE | Noted: 2024-10-31

## 2024-10-31 PROBLEM — D23.71 OTHER BENIGN NEOPLASM OF SKIN OF RIGHT LOWER LIMB, INCLUDING HIP: Status: ACTIVE | Noted: 2024-10-31

## 2024-10-31 PROBLEM — D22.5 MELANOCYTIC NEVI OF TRUNK: Status: ACTIVE | Noted: 2024-10-31

## 2024-10-31 PROCEDURE — 17110 DESTRUCTION B9 LES UP TO 14: CPT

## 2024-10-31 PROCEDURE — ? RECOMMENDATIONS

## 2024-10-31 PROCEDURE — ? COUNSELING

## 2024-10-31 PROCEDURE — 99203 OFFICE O/P NEW LOW 30 MIN: CPT | Mod: 25

## 2024-10-31 PROCEDURE — ? LIQUID NITROGEN

## 2024-10-31 ASSESSMENT — LOCATION SIMPLE DESCRIPTION DERM
LOCATION SIMPLE: RIGHT PLANTAR SURFACE
LOCATION SIMPLE: LEFT CHEEK
LOCATION SIMPLE: RIGHT CHEEK
LOCATION SIMPLE: LEFT HAND
LOCATION SIMPLE: CHEST
LOCATION SIMPLE: ABDOMEN
LOCATION SIMPLE: RIGHT HAND
LOCATION SIMPLE: LEFT UPPER BACK
LOCATION SIMPLE: RIGHT UPPER BACK
LOCATION SIMPLE: LEFT FOREARM
LOCATION SIMPLE: RIGHT PLANTAR SURFACE

## 2024-10-31 ASSESSMENT — LOCATION ZONE DERM
LOCATION ZONE: TRUNK
LOCATION ZONE: FEET
LOCATION ZONE: FACE
LOCATION ZONE: ARM
LOCATION ZONE: HAND
LOCATION ZONE: FEET

## 2024-10-31 ASSESSMENT — LOCATION DETAILED DESCRIPTION DERM
LOCATION DETAILED: RIGHT RADIAL DORSAL HAND
LOCATION DETAILED: RIGHT INFERIOR UPPER BACK
LOCATION DETAILED: RIGHT MEDIAL PLANTAR HEEL
LOCATION DETAILED: RIGHT MEDIAL SUPERIOR CHEST
LOCATION DETAILED: LEFT PROXIMAL DORSAL FOREARM
LOCATION DETAILED: EPIGASTRIC SKIN
LOCATION DETAILED: RIGHT LATERAL MALAR CHEEK
LOCATION DETAILED: LEFT SUPERIOR MEDIAL UPPER BACK
LOCATION DETAILED: LEFT INFERIOR MEDIAL MALAR CHEEK
LOCATION DETAILED: RIGHT MEDIAL PLANTAR HEEL
LOCATION DETAILED: LEFT ULNAR DORSAL HAND
LOCATION DETAILED: LEFT INFERIOR CENTRAL MALAR CHEEK
LOCATION DETAILED: PERIUMBILICAL SKIN

## 2024-10-31 NOTE — PROCEDURE: RECOMMENDATIONS
Detail Level: Zone
Recommendation Preamble: The following recommendations were made during the visit: compound wart
Render Risk Assessment In Note?: no

## 2024-11-19 ENCOUNTER — APPOINTMENT (RX ONLY)
Dept: URBAN - METROPOLITAN AREA CLINIC 15 | Facility: CLINIC | Age: 24
Setting detail: DERMATOLOGY
End: 2024-11-19

## 2024-11-19 DIAGNOSIS — B07.0 PLANTAR WART: ICD-10-CM

## 2024-11-19 PROCEDURE — ? RECOMMENDATIONS

## 2024-11-19 PROCEDURE — 17110 DESTRUCTION B9 LES UP TO 14: CPT

## 2024-11-19 PROCEDURE — ? COUNSELING

## 2024-11-19 PROCEDURE — ? BENIGN DESTRUCTION

## 2024-11-19 ASSESSMENT — LOCATION ZONE DERM
LOCATION ZONE: FEET
LOCATION ZONE: FEET

## 2024-11-19 ASSESSMENT — LOCATION DETAILED DESCRIPTION DERM
LOCATION DETAILED: RIGHT MEDIAL PLANTAR HEEL
LOCATION DETAILED: RIGHT MEDIAL PLANTAR HEEL

## 2024-11-19 ASSESSMENT — LOCATION SIMPLE DESCRIPTION DERM
LOCATION SIMPLE: RIGHT PLANTAR SURFACE
LOCATION SIMPLE: RIGHT PLANTAR SURFACE

## 2024-11-19 NOTE — PROCEDURE: BENIGN DESTRUCTION
Medical Necessity Clause: This procedure was medically necessary because the lesions that were treated were:
Render Note In Bullet Format When Appropriate: No
Detail Level: Detailed
Consent: The patient's consent was obtained including but not limited to risks of crusting, scabbing, blistering, scarring, darker or lighter pigmentary change, recurrence, incomplete removal and infection.
Treatment Number (Will Not Render If 0): 0
Anesthesia Volume In Cc: 0.5
Post-Care Instructions: I reviewed with the patient in detail post-care instructions. Patient is to wear sunprotection, and avoid picking at any of the treated lesions. Pt may apply Vaseline to crusted or scabbing areas.
Medical Necessity Information: It is in your best interest to select a reason for this procedure from the list below. All of these items fulfill various CMS LCD requirements except the new and changing color options.

## 2024-12-10 ENCOUNTER — APPOINTMENT (OUTPATIENT)
Dept: URBAN - METROPOLITAN AREA CLINIC 15 | Facility: CLINIC | Age: 24
Setting detail: DERMATOLOGY
End: 2024-12-10

## 2024-12-10 DIAGNOSIS — B07.0 PLANTAR WART: ICD-10-CM | Status: IMPROVED

## 2024-12-10 PROCEDURE — ? COUNSELING

## 2024-12-10 PROCEDURE — ? BENIGN DESTRUCTION

## 2024-12-10 PROCEDURE — 17110 DESTRUCTION B9 LES UP TO 14: CPT

## 2024-12-10 ASSESSMENT — LOCATION SIMPLE DESCRIPTION DERM
LOCATION SIMPLE: RIGHT PLANTAR SURFACE
LOCATION SIMPLE: RIGHT PLANTAR SURFACE

## 2024-12-10 ASSESSMENT — LOCATION DETAILED DESCRIPTION DERM
LOCATION DETAILED: RIGHT MEDIAL PLANTAR HEEL
LOCATION DETAILED: RIGHT MEDIAL PLANTAR HEEL

## 2024-12-10 ASSESSMENT — LOCATION ZONE DERM
LOCATION ZONE: FEET
LOCATION ZONE: FEET

## 2025-03-25 ENCOUNTER — APPOINTMENT (OUTPATIENT)
Dept: URBAN - METROPOLITAN AREA CLINIC 15 | Facility: CLINIC | Age: 25
Setting detail: DERMATOLOGY
End: 2025-03-25

## 2025-03-25 DIAGNOSIS — Q828 OTHER SPECIFIED ANOMALIES OF SKIN: ICD-10-CM

## 2025-03-25 DIAGNOSIS — B07.0 PLANTAR WART: ICD-10-CM | Status: RESOLVED

## 2025-03-25 DIAGNOSIS — D22 MELANOCYTIC NEVI: ICD-10-CM

## 2025-03-25 DIAGNOSIS — Q826 OTHER SPECIFIED ANOMALIES OF SKIN: ICD-10-CM

## 2025-03-25 DIAGNOSIS — Q819 OTHER SPECIFIED ANOMALIES OF SKIN: ICD-10-CM

## 2025-03-25 PROBLEM — D22.61 MELANOCYTIC NEVI OF RIGHT UPPER LIMB, INCLUDING SHOULDER: Status: ACTIVE | Noted: 2025-03-25

## 2025-03-25 PROBLEM — Q82.8 OTHER SPECIFIED CONGENITAL MALFORMATIONS OF SKIN: Status: ACTIVE | Noted: 2025-03-25

## 2025-03-25 PROCEDURE — ? COUNSELING

## 2025-03-25 PROCEDURE — 99213 OFFICE O/P EST LOW 20 MIN: CPT

## 2025-03-25 PROCEDURE — ? RECOMMENDATIONS

## 2025-03-25 ASSESSMENT — LOCATION DETAILED DESCRIPTION DERM
LOCATION DETAILED: LEFT PROXIMAL PRETIBIAL REGION
LOCATION DETAILED: LEFT ANTERIOR DISTAL THIGH
LOCATION DETAILED: RIGHT DISTAL POSTERIOR UPPER ARM
LOCATION DETAILED: LEFT PROXIMAL DORSAL FOREARM
LOCATION DETAILED: RIGHT MEDIAL PLANTAR HEEL
LOCATION DETAILED: LEFT PROXIMAL POSTERIOR UPPER ARM
LOCATION DETAILED: RIGHT ANTERIOR PROXIMAL THIGH
LOCATION DETAILED: RIGHT DISTAL DORSAL FOREARM
LOCATION DETAILED: RIGHT PROXIMAL DORSAL FOREARM
LOCATION DETAILED: RIGHT PROXIMAL PRETIBIAL REGION

## 2025-03-25 ASSESSMENT — LOCATION SIMPLE DESCRIPTION DERM
LOCATION SIMPLE: LEFT POSTERIOR UPPER ARM
LOCATION SIMPLE: RIGHT POSTERIOR UPPER ARM
LOCATION SIMPLE: LEFT PRETIBIAL REGION
LOCATION SIMPLE: RIGHT THIGH
LOCATION SIMPLE: LEFT FOREARM
LOCATION SIMPLE: RIGHT FOREARM
LOCATION SIMPLE: RIGHT PLANTAR SURFACE
LOCATION SIMPLE: RIGHT PRETIBIAL REGION
LOCATION SIMPLE: LEFT THIGH

## 2025-03-25 ASSESSMENT — TOTAL NUMBER OF LESIONS: # OF LESIONS?: 0

## 2025-03-25 ASSESSMENT — LOCATION ZONE DERM
LOCATION ZONE: FEET
LOCATION ZONE: ARM
LOCATION ZONE: LEG

## 2025-03-25 ASSESSMENT — AREA OF WARTS IN CM2: TOTAL AREA OF ALL WARTS IN CM2: 0

## 2025-03-25 NOTE — PROCEDURE: RECOMMENDATIONS
Recommendation Preamble: The following recommendations were made during the visit: exfoliating skin when taking a shower
Detail Level: Zone
Render Risk Assessment In Note?: no